# Patient Record
Sex: FEMALE | Race: WHITE | Employment: FULL TIME | URBAN - METROPOLITAN AREA
[De-identification: names, ages, dates, MRNs, and addresses within clinical notes are randomized per-mention and may not be internally consistent; named-entity substitution may affect disease eponyms.]

---

## 2021-12-13 ENCOUNTER — HOSPITAL ENCOUNTER (INPATIENT)
Age: 26
LOS: 2 days | Discharge: HOME | DRG: 514 | End: 2021-12-15
Attending: ORTHOPAEDIC SURGERY | Admitting: ORTHOPAEDIC SURGERY
Payer: COMMERCIAL

## 2021-12-13 ENCOUNTER — ANESTHESIA EVENT (OUTPATIENT)
Dept: SURGERY | Age: 26
DRG: 514 | End: 2021-12-13
Payer: COMMERCIAL

## 2021-12-13 ENCOUNTER — ANESTHESIA (OUTPATIENT)
Dept: SURGERY | Age: 26
DRG: 514 | End: 2021-12-13
Payer: COMMERCIAL

## 2021-12-13 DIAGNOSIS — L03.011 CELLULITIS OF FINGER OF RIGHT HAND: Primary | ICD-10-CM

## 2021-12-13 PROBLEM — L03.019 CELLULITIS, FINGER: Status: ACTIVE | Noted: 2021-12-13

## 2021-12-13 LAB
COVID-19 RAPID TEST, COVR: NOT DETECTED
HCG UR QL: NEGATIVE
SOURCE, COVRS: NORMAL

## 2021-12-13 PROCEDURE — 74011250636 HC RX REV CODE- 250/636: Performed by: PHYSICIAN ASSISTANT

## 2021-12-13 PROCEDURE — 74011250636 HC RX REV CODE- 250/636: Performed by: ANESTHESIOLOGY

## 2021-12-13 PROCEDURE — 77030019895 HC PCKNG STRP IODO -A: Performed by: ORTHOPAEDIC SURGERY

## 2021-12-13 PROCEDURE — 87635 SARS-COV-2 COVID-19 AMP PRB: CPT

## 2021-12-13 PROCEDURE — 2709999900 HC NON-CHARGEABLE SUPPLY: Performed by: ORTHOPAEDIC SURGERY

## 2021-12-13 PROCEDURE — 87205 SMEAR GRAM STAIN: CPT

## 2021-12-13 PROCEDURE — 76060000033 HC ANESTHESIA 1 TO 1.5 HR: Performed by: ORTHOPAEDIC SURGERY

## 2021-12-13 PROCEDURE — 76210000016 HC OR PH I REC 1 TO 1.5 HR: Performed by: ORTHOPAEDIC SURGERY

## 2021-12-13 PROCEDURE — 65270000029 HC RM PRIVATE

## 2021-12-13 PROCEDURE — 87075 CULTR BACTERIA EXCEPT BLOOD: CPT

## 2021-12-13 PROCEDURE — 81025 URINE PREGNANCY TEST: CPT

## 2021-12-13 PROCEDURE — 76010000149 HC OR TIME 1 TO 1.5 HR: Performed by: ORTHOPAEDIC SURGERY

## 2021-12-13 PROCEDURE — 0LB70ZZ EXCISION OF RIGHT HAND TENDON, OPEN APPROACH: ICD-10-PCS | Performed by: ORTHOPAEDIC SURGERY

## 2021-12-13 PROCEDURE — 74011250636 HC RX REV CODE- 250/636: Performed by: NURSE ANESTHETIST, CERTIFIED REGISTERED

## 2021-12-13 PROCEDURE — 0K9C3ZZ DRAINAGE OF RIGHT HAND MUSCLE, PERCUTANEOUS APPROACH: ICD-10-PCS | Performed by: ORTHOPAEDIC SURGERY

## 2021-12-13 PROCEDURE — 77030002888 HC SUT CHRMC J&J -A: Performed by: ORTHOPAEDIC SURGERY

## 2021-12-13 RX ORDER — SODIUM CHLORIDE, SODIUM LACTATE, POTASSIUM CHLORIDE, CALCIUM CHLORIDE 600; 310; 30; 20 MG/100ML; MG/100ML; MG/100ML; MG/100ML
100 INJECTION, SOLUTION INTRAVENOUS CONTINUOUS
Status: DISCONTINUED | OUTPATIENT
Start: 2021-12-13 | End: 2021-12-13 | Stop reason: HOSPADM

## 2021-12-13 RX ORDER — MIDAZOLAM HYDROCHLORIDE 1 MG/ML
INJECTION, SOLUTION INTRAMUSCULAR; INTRAVENOUS AS NEEDED
Status: DISCONTINUED | OUTPATIENT
Start: 2021-12-13 | End: 2021-12-13 | Stop reason: HOSPADM

## 2021-12-13 RX ORDER — HYDROXYZINE HYDROCHLORIDE 10 MG/1
10 TABLET, FILM COATED ORAL
Status: DISCONTINUED | OUTPATIENT
Start: 2021-12-13 | End: 2021-12-15 | Stop reason: HOSPADM

## 2021-12-13 RX ORDER — RIZATRIPTAN BENZOATE 5 MG/1
10 TABLET, ORALLY DISINTEGRATING ORAL
Status: DISPENSED | OUTPATIENT
Start: 2021-12-13 | End: 2021-12-14

## 2021-12-13 RX ORDER — SODIUM CHLORIDE 0.9 % (FLUSH) 0.9 %
5-40 SYRINGE (ML) INJECTION AS NEEDED
Status: DISCONTINUED | OUTPATIENT
Start: 2021-12-13 | End: 2021-12-15 | Stop reason: HOSPADM

## 2021-12-13 RX ORDER — FENTANYL CITRATE 50 UG/ML
INJECTION, SOLUTION INTRAMUSCULAR; INTRAVENOUS AS NEEDED
Status: DISCONTINUED | OUTPATIENT
Start: 2021-12-13 | End: 2021-12-13 | Stop reason: HOSPADM

## 2021-12-13 RX ORDER — SODIUM CHLORIDE, SODIUM LACTATE, POTASSIUM CHLORIDE, CALCIUM CHLORIDE 600; 310; 30; 20 MG/100ML; MG/100ML; MG/100ML; MG/100ML
INJECTION, SOLUTION INTRAVENOUS
Status: DISCONTINUED | OUTPATIENT
Start: 2021-12-13 | End: 2021-12-13 | Stop reason: HOSPADM

## 2021-12-13 RX ORDER — MIDAZOLAM HYDROCHLORIDE 1 MG/ML
INJECTION, SOLUTION INTRAMUSCULAR; INTRAVENOUS
Status: DISPENSED
Start: 2021-12-13 | End: 2021-12-14

## 2021-12-13 RX ORDER — PROPOFOL 10 MG/ML
INJECTION, EMULSION INTRAVENOUS
Status: DISCONTINUED | OUTPATIENT
Start: 2021-12-13 | End: 2021-12-13 | Stop reason: HOSPADM

## 2021-12-13 RX ORDER — POLYETHYLENE GLYCOL 3350 17 G/17G
17 POWDER, FOR SOLUTION ORAL DAILY
Status: DISCONTINUED | OUTPATIENT
Start: 2021-12-14 | End: 2021-12-15 | Stop reason: HOSPADM

## 2021-12-13 RX ORDER — OXYCODONE HYDROCHLORIDE 5 MG/1
5 TABLET ORAL
Status: DISCONTINUED | OUTPATIENT
Start: 2021-12-13 | End: 2021-12-15 | Stop reason: HOSPADM

## 2021-12-13 RX ORDER — ACETAMINOPHEN 500 MG
1000 TABLET ORAL EVERY 6 HOURS
Status: DISCONTINUED | OUTPATIENT
Start: 2021-12-14 | End: 2021-12-15 | Stop reason: HOSPADM

## 2021-12-13 RX ORDER — ACETAMINOPHEN 325 MG/1
650 TABLET ORAL
COMMUNITY

## 2021-12-13 RX ORDER — OXYCODONE HYDROCHLORIDE 5 MG/1
10 TABLET ORAL
Status: DISCONTINUED | OUTPATIENT
Start: 2021-12-13 | End: 2021-12-15 | Stop reason: HOSPADM

## 2021-12-13 RX ORDER — AMOXICILLIN 250 MG
1 CAPSULE ORAL 2 TIMES DAILY
Status: DISCONTINUED | OUTPATIENT
Start: 2021-12-13 | End: 2021-12-15 | Stop reason: HOSPADM

## 2021-12-13 RX ORDER — RIZATRIPTAN BENZOATE 10 MG/1
10 TABLET ORAL
COMMUNITY

## 2021-12-13 RX ORDER — LIDOCAINE HYDROCHLORIDE 10 MG/ML
0.1 INJECTION, SOLUTION EPIDURAL; INFILTRATION; INTRACAUDAL; PERINEURAL AS NEEDED
Status: DISCONTINUED | OUTPATIENT
Start: 2021-12-13 | End: 2021-12-13 | Stop reason: HOSPADM

## 2021-12-13 RX ORDER — FAMOTIDINE 20 MG/1
20 TABLET, FILM COATED ORAL 2 TIMES DAILY
Status: DISCONTINUED | OUTPATIENT
Start: 2021-12-13 | End: 2021-12-15 | Stop reason: HOSPADM

## 2021-12-13 RX ORDER — ONDANSETRON 2 MG/ML
4 INJECTION INTRAMUSCULAR; INTRAVENOUS
Status: DISPENSED | OUTPATIENT
Start: 2021-12-13 | End: 2021-12-14

## 2021-12-13 RX ORDER — SODIUM CHLORIDE 0.9 % (FLUSH) 0.9 %
5-40 SYRINGE (ML) INJECTION EVERY 8 HOURS
Status: DISCONTINUED | OUTPATIENT
Start: 2021-12-13 | End: 2021-12-15 | Stop reason: HOSPADM

## 2021-12-13 RX ORDER — ONDANSETRON 2 MG/ML
INJECTION INTRAMUSCULAR; INTRAVENOUS AS NEEDED
Status: DISCONTINUED | OUTPATIENT
Start: 2021-12-13 | End: 2021-12-13 | Stop reason: HOSPADM

## 2021-12-13 RX ORDER — NALOXONE HYDROCHLORIDE 0.4 MG/ML
0.4 INJECTION, SOLUTION INTRAMUSCULAR; INTRAVENOUS; SUBCUTANEOUS AS NEEDED
Status: DISCONTINUED | OUTPATIENT
Start: 2021-12-13 | End: 2021-12-15 | Stop reason: HOSPADM

## 2021-12-13 RX ORDER — HYDROMORPHONE HYDROCHLORIDE 1 MG/ML
.25-1 INJECTION, SOLUTION INTRAMUSCULAR; INTRAVENOUS; SUBCUTANEOUS
Status: DISCONTINUED | OUTPATIENT
Start: 2021-12-13 | End: 2021-12-13 | Stop reason: HOSPADM

## 2021-12-13 RX ORDER — FACIAL-BODY WIPES
10 EACH TOPICAL DAILY PRN
Status: DISCONTINUED | OUTPATIENT
Start: 2021-12-15 | End: 2021-12-15 | Stop reason: HOSPADM

## 2021-12-13 RX ORDER — HYDROMORPHONE HYDROCHLORIDE 1 MG/ML
0.5 INJECTION, SOLUTION INTRAMUSCULAR; INTRAVENOUS; SUBCUTANEOUS
Status: ACTIVE | OUTPATIENT
Start: 2021-12-13 | End: 2021-12-14

## 2021-12-13 RX ORDER — FENTANYL CITRATE 50 UG/ML
INJECTION, SOLUTION INTRAMUSCULAR; INTRAVENOUS
Status: DISPENSED
Start: 2021-12-13 | End: 2021-12-14

## 2021-12-13 RX ADMIN — FENTANYL CITRATE 50 MCG: 50 INJECTION, SOLUTION INTRAMUSCULAR; INTRAVENOUS at 17:05

## 2021-12-13 RX ADMIN — SODIUM CHLORIDE, POTASSIUM CHLORIDE, SODIUM LACTATE AND CALCIUM CHLORIDE 100 ML/HR: 600; 310; 30; 20 INJECTION, SOLUTION INTRAVENOUS at 14:58

## 2021-12-13 RX ADMIN — MEPIVACAINE HYDROCHLORIDE 25 ML: 20 INJECTION, SOLUTION EPIDURAL; INFILTRATION at 17:03

## 2021-12-13 RX ADMIN — ONDANSETRON HYDROCHLORIDE 4 MG: 2 SOLUTION INTRAMUSCULAR; INTRAVENOUS at 18:02

## 2021-12-13 RX ADMIN — PROMETHAZINE HYDROCHLORIDE 12.5 MG: 25 INJECTION INTRAMUSCULAR; INTRAVENOUS at 21:29

## 2021-12-13 RX ADMIN — VANCOMYCIN HYDROCHLORIDE 1 G: 10 INJECTION, POWDER, LYOPHILIZED, FOR SOLUTION INTRAVENOUS at 18:53

## 2021-12-13 RX ADMIN — HYDROMORPHONE HYDROCHLORIDE 0.5 MG: 1 INJECTION, SOLUTION INTRAMUSCULAR; INTRAVENOUS; SUBCUTANEOUS at 19:57

## 2021-12-13 RX ADMIN — MIDAZOLAM 2 MG: 1 INJECTION, SOLUTION INTRAMUSCULAR; INTRAVENOUS at 16:58

## 2021-12-13 RX ADMIN — MIDAZOLAM 2 MG: 1 INJECTION, SOLUTION INTRAMUSCULAR; INTRAVENOUS at 18:11

## 2021-12-13 RX ADMIN — SODIUM CHLORIDE, POTASSIUM CHLORIDE, SODIUM LACTATE AND CALCIUM CHLORIDE: 600; 310; 30; 20 INJECTION, SOLUTION INTRAVENOUS at 17:55

## 2021-12-13 RX ADMIN — ONDANSETRON 4 MG: 2 INJECTION INTRAMUSCULAR; INTRAVENOUS at 20:44

## 2021-12-13 RX ADMIN — HYDROMORPHONE HYDROCHLORIDE 0.5 MG: 1 INJECTION, SOLUTION INTRAMUSCULAR; INTRAVENOUS; SUBCUTANEOUS at 20:16

## 2021-12-13 RX ADMIN — HYDROMORPHONE HYDROCHLORIDE 0.5 MG: 1 INJECTION, SOLUTION INTRAMUSCULAR; INTRAVENOUS; SUBCUTANEOUS at 19:40

## 2021-12-13 RX ADMIN — PROPOFOL 200 MCG/KG/MIN: 10 INJECTION, EMULSION INTRAVENOUS at 18:09

## 2021-12-13 RX ADMIN — HYDROMORPHONE HYDROCHLORIDE 0.5 MG: 1 INJECTION, SOLUTION INTRAMUSCULAR; INTRAVENOUS; SUBCUTANEOUS at 19:25

## 2021-12-13 RX ADMIN — FENTANYL CITRATE 50 MCG: 50 INJECTION, SOLUTION INTRAMUSCULAR; INTRAVENOUS at 16:58

## 2021-12-13 NOTE — ANESTHESIA PREPROCEDURE EVALUATION
Relevant Problems   No relevant active problems       Anesthetic History   No history of anesthetic complications            Review of Systems / Medical History  Patient summary reviewed and pertinent labs reviewed    Pulmonary            Asthma : well controlled       Neuro/Psych   Within defined limits           Cardiovascular                  Exercise tolerance: >4 METS     GI/Hepatic/Renal  Within defined limits              Endo/Other  Within defined limits           Other Findings              Physical Exam    Airway  Mallampati: II  TM Distance: 4 - 6 cm  Neck ROM: normal range of motion   Mouth opening: Normal     Cardiovascular  Regular rate and rhythm,  S1 and S2 normal,  no murmur, click, rub, or gallop  Rhythm: regular  Rate: normal         Dental  No notable dental hx       Pulmonary  Breath sounds clear to auscultation               Abdominal  GI exam deferred       Other Findings            Anesthetic Plan    ASA: 2  Anesthesia type: regional - supraclavicular block      Post-op pain plan if not by surgeon: peripheral nerve block single      Anesthetic plan and risks discussed with: Patient

## 2021-12-13 NOTE — BRIEF OP NOTE
Brief Postoperative Note    Patient: Sharon Tanner  YOB: 1995  MRN: [id number]    Date of Procedure: 12/13/2021     Pre-Op Diagnosis: Cat Bite    Post-Op Diagnosis: Same as preoperative diagnosis. Procedure(s):  INCISION AND DRAINAGE RIGHT LONG FINGER, FLEXOR TENOSYNOVECTOMY    Surgeon(s):  Eusebia Dong MD    Surgical Assistant: Physician Assistant: Ellie Murphy PA-C    Anesthesia: Regional     Estimated Blood Loss (mL): Minimal    Complications: None    Specimens:   ID Type Source Tests Collected by Time Destination   1 : Right long finger Wound  CULTURE, AEROBIC AND ANAEROBIC Eusebia Dong MD 12/13/2021 1846 Microbiology        Implants: * No implants in log *    Drains: * No LDAs found *    Findings: As above.     Electronically Signed by Shun Lee PA-C on 12/13/2021 at 6:59 PM

## 2021-12-13 NOTE — ANESTHESIA PROCEDURE NOTES
Peripheral Block    Start time: 12/13/2021 4:58 PM  End time: 12/13/2021 5:06 PM  Performed by: Hardy Alicea MD  Authorized by: Hardy Alicea MD       Pre-procedure:    Indications: at surgeon's request, post-op pain management and primary anesthetic    Preanesthetic Checklist: patient identified, risks and benefits discussed, timeout performed and anesthesia consent given    Timeout Time: 16:58 EST          Block Type:   Block Type:  Supraclavicular  Laterality:  Right  Monitoring:  Continuous pulse ox, frequent vital sign checks, heart rate, responsive to questions and oxygen  Injection Technique:  Single shot  Procedures: ultrasound guided    Patient Position: supine  Prep: chlorhexidine    Location:  Interscalene  Needle Type:  Stimuplex  Needle Gauge:  22 G  Needle Localization:  Ultrasound guidance    Assessment:  Number of attempts:  1  Injection Assessment:  Incremental injection every 5 mL, local visualized surrounding nerve on ultrasound, negative aspiration for blood, no paresthesia and no intravascular symptoms  Patient tolerance:  Patient tolerated the procedure well with no immediate complications

## 2021-12-14 PROCEDURE — 65270000029 HC RM PRIVATE

## 2021-12-14 PROCEDURE — 74011250636 HC RX REV CODE- 250/636: Performed by: PHYSICIAN ASSISTANT

## 2021-12-14 PROCEDURE — 74011250637 HC RX REV CODE- 250/637: Performed by: PHYSICIAN ASSISTANT

## 2021-12-14 RX ADMIN — Medication 10 ML: at 14:08

## 2021-12-14 RX ADMIN — ACETAMINOPHEN 1000 MG: 500 TABLET ORAL at 00:09

## 2021-12-14 RX ADMIN — OXYCODONE 10 MG: 5 TABLET ORAL at 14:07

## 2021-12-14 RX ADMIN — ACETAMINOPHEN 1000 MG: 500 TABLET ORAL at 05:33

## 2021-12-14 RX ADMIN — ACETAMINOPHEN 1000 MG: 500 TABLET ORAL at 17:02

## 2021-12-14 RX ADMIN — VANCOMYCIN HYDROCHLORIDE 1000 MG: 1 INJECTION, POWDER, LYOPHILIZED, FOR SOLUTION INTRAVENOUS at 05:30

## 2021-12-14 RX ADMIN — DOCUSATE SODIUM 50MG AND SENNOSIDES 8.6MG 1 TABLET: 8.6; 5 TABLET, FILM COATED ORAL at 17:02

## 2021-12-14 RX ADMIN — OXYCODONE 5 MG: 5 TABLET ORAL at 00:10

## 2021-12-14 RX ADMIN — ONDANSETRON 4 MG: 2 INJECTION INTRAMUSCULAR; INTRAVENOUS at 14:07

## 2021-12-14 RX ADMIN — VANCOMYCIN HYDROCHLORIDE 750 MG: 750 INJECTION, POWDER, LYOPHILIZED, FOR SOLUTION INTRAVENOUS at 17:02

## 2021-12-14 RX ADMIN — POLYETHYLENE GLYCOL 3350 17 G: 17 POWDER, FOR SOLUTION ORAL at 08:03

## 2021-12-14 RX ADMIN — ACETAMINOPHEN 1000 MG: 500 TABLET ORAL at 11:31

## 2021-12-14 RX ADMIN — FAMOTIDINE 20 MG: 20 TABLET ORAL at 17:03

## 2021-12-14 RX ADMIN — FAMOTIDINE 20 MG: 20 TABLET ORAL at 08:03

## 2021-12-14 RX ADMIN — DOCUSATE SODIUM 50MG AND SENNOSIDES 8.6MG 1 TABLET: 8.6; 5 TABLET, FILM COATED ORAL at 08:03

## 2021-12-14 RX ADMIN — Medication 10 ML: at 05:35

## 2021-12-14 NOTE — PERIOP NOTES
TRANSFER - OUT REPORT:    Verbal report given to MERT Marinelli on [place]  being transferred to pre-op room 4  for routine post - op       Report consisted of patients Situation, Background, Assessment and   Recommendations(SBAR). Information from the following report(s) SBAR, Kardex, OR Summary, Procedure Summary, Intake/Output, MAR, Accordion, Recent Results, Med Rec Status and Cardiac Rhythm NSR was reviewed with the receiving nurse. Lines:   Peripheral IV 12/13/21 Anterior; Left Hand (Active)   Site Assessment Clean, dry, & intact 12/13/21 2033   Phlebitis Assessment 0 12/13/21 2033   Infiltration Assessment 0 12/13/21 2033   Dressing Status Clean, dry, & intact 12/13/21 2033   Dressing Type Transparent; Tape 12/13/21 2033   Hub Color/Line Status Pink; Flushed; Infusing 12/13/21 2033   Action Taken Open ports on tubing capped 12/13/21 2033   Alcohol Cap Used Yes 12/13/21 2033        Opportunity for questions and clarification was provided.       Patient transported with:   Registered Nurse

## 2021-12-14 NOTE — PROGRESS NOTES
Reason for Admission: Cat bite                    RUR Score:  1% LOW                     Plan for utilizing home health: Per recommendation pending progression during hospitalization stay- not anticipated at this time     PCP: First and Last name:  Willow Reynoso MD   Name of Practice:    Are you a current patient: Yes/No: Yes    Approximate date of last visit: Thursday prior to admission    Can you participate in a virtual visit with your PCP: Yes      Current Advanced Directive/Advance Care Plan: No Order- no AMD on file- pt is not interested in arranging any at this time, understands her  is Vinetta Diver. Healthcare Decision Maker:  Updated to reflect information obtained. Transition of Care Plan:                    Pt is a 23year old,  female, admitted with an elective surgery for cat bite. Pt was AOX4 when speaking with CM, confirming address, emergency contact and PCP. Pt lives with her  in a one level home and is completely independent with ADLs- has no DME and drives, works full time at [employer]. Pt has not had HH or been to any SNF/IPR in the past. Pt confirmed insurance coverage of [employer], states no problems affording or accessing medications. Pt's  will drive her home at time of discharge and as needed. Pt remains on Med. Surg- post op for elective surgery secondary to a cat bite- anticipated d/c date in 24-48 hours pending progression and potential need of abx. Pt states no questions or concerns at this time- floor CM updated and will continue to follow and assist as needed. Care Management Interventions  PCP Verified by CM:  Yes  Mode of Transport at Discharge: Self  Transition of Care Consult (CM Consult): Discharge Planning  MyChart Signup: No  Discharge Durable Medical Equipment: No  Health Maintenance Reviewed: Yes  Physical Therapy Consult: No  Occupational Therapy Consult: No  Speech Therapy Consult: No  Support Systems: Spouse/Significant Other  Confirm Follow Up Transport: Family  The Patient and/or Patient Representative was Provided with a Choice of Provider and Agrees with the Discharge Plan?: Yes  Freedom of Choice List was Provided with Basic Dialogue that Supports the Patient's Individualized Plan of Care/Goals, Treatment Preferences and Shares the Quality Data Associated with the Providers?: Yes  Discharge Location  Discharge Placement: Home with family assistance    SHASTA Barba, [address]

## 2021-12-14 NOTE — PROGRESS NOTES
[address] [address] [facility] Pharm[facility] Progress Note: Antimicrobial Stewardship    Consult for antibiotic dosing of vancomycin by Dr. Soco Gómez  Indication: cat bite  Day of Therapy: 2  S/p I&D right finger and flexor tenosynovectomy on 12/13/21    Plan:  Vancomycin therapy:   No loading dose, patient already on vancomycin 1000mg (20mg/kg) q12h post-op but will continue past the two doses that were originally ordered until cultures finalize. Adjust maintenance dosing to vancomycin 750mg IV every 12 hours. Dose calculated to approximate a   Target AUC/EZEQUIEL of 400-500  Trough of 10-15   Plan:  Not able to calculate CrCl as no Scr done (ordered one for tomorrow), current regimen predicts AUC of 528 (based on empiric Scr value of 1)-unable to predict true AUC without Scr value, do not anticipate any issues with clearing vancomycin based on age and past medical history. Will not order any level at this time as hopeful to stop abx shortly (will order tomorrow if patient remains on therapy). Pharmacy to follow daily and will make changes to dose and/or frequency based on clinical status. Date Dose & Interval Measured (mcg/mL) Extrapolated (mcg/mL)   ? ? ? ?   ? ? ? ?   ? ? ? ? Other Antimicrobial  (not dosed by pharmacist)   none   Cultures     12/13 right long finger: no organisms (prelim)  12/13 anaerobic: in process   Serum Creatinine     No results found for: JOSELITO, ACREA, CREA, REFC3, REFC4, CREAPOC    Creatinine Clearance CrCl cannot be calculated (No successful lab value found. ).      Procalcitonin  No results found for: PCT     Temp   98.6 °F (37 °C)    WBC   No results found for: WBC    For Antifungals, Metronidazole and Nafcillin: No results found for: ALT, AST, AP, TBILI      Pharmacist: Signed Kylie Padilla

## 2021-12-14 NOTE — PERIOP NOTES
TRANSFER - OUT REPORT:    Verbal report given to [facility]name[facility] on [place]  being transferred to [facility] (unit) for routine post - op       Report consisted of patients Situation, Background, Assessment and   Recommendations(SBAR). Information from the following report(s) SBAR, Kardex, Intake/Output, MAR, Recent Results and Cardiac Rhythm NSR was reviewed with the receiving nurse. Lines:   Peripheral IV 12/13/21 Anterior; Left Hand (Active)   Site Assessment Clean, dry, & intact 12/13/21 2050   Phlebitis Assessment 0 12/13/21 2050   Infiltration Assessment 0 12/13/21 2050   Dressing Status Clean, dry, & intact 12/13/21 2050   Dressing Type Transparent 12/13/21 2050   Hub Color/Line Status Pink; Infusing 12/13/21 2050   Action Taken Open ports on tubing capped 12/13/21 2050   Alcohol Cap Used Yes 12/13/21 2050        Opportunity for questions and clarification was provided.       Patient transported with:   Registered Nurse

## 2021-12-14 NOTE — ANESTHESIA POSTPROCEDURE EVALUATION
Procedure(s):  INCISION AND DRAINAGE RIGHT LONG FINGER, FLEXOR TENSYNOVECTOMY.    regional    Anesthesia Post Evaluation        Patient location during evaluation: PACU  Patient participation: complete - patient participated  Level of consciousness: sleepy but conscious  Pain management: adequate  Airway patency: patent  Anesthetic complications: no  Cardiovascular status: acceptable and stable  Respiratory status: acceptable and unassisted  Hydration status: acceptable  Comments: The patient was seen and evaluated in the post-operative period. The time of my evaluation may not match the time of this note. The patient denied uncontrolled pain or nausea, and there were no significant complications evident. Tamela Walsh MD      Post anesthesia nausea and vomiting:  none  Final Post Anesthesia Temperature Assessment:  Normothermia (36.0-37.5 degrees C)      INITIAL Post-op Vital signs:   Vitals Value Taken Time   /79 12/13/21 1910   Temp 36.5 °C (97.7 °F) 12/13/21 1908   Pulse 92 12/13/21 1914   Resp 14 12/13/21 1914   SpO2 98 % 12/13/21 1914   Vitals shown include unvalidated device data.

## 2021-12-14 NOTE — PERIOP NOTES
2044  Pt nauseated. Zofran 4 mg given IVP per PRN meds. 2129  Pt vomited x4 with no relief of nausea. Anesthesia called order received. Phenergan 12.5 mg IVP given for nausea.

## 2021-12-14 NOTE — OP NOTES
[facility]  OPERATIVE REPORT    Name:  Audie Bennett  MR#:  [id number]  :  1995  ACCOUNT #:  [de-identified]  DATE OF SERVICE:  2021    PREOPERATIVE DIAGNOSIS:  Right long finger cat bite with flexor tenosynovitis. POSTOPERATIVE DIAGNOSIS:  Right long finger cat bite with flexor tenosynovitis. PROCEDURES PERFORMED:  Right long finger incision and drainage and flexor tenosynovectomy. SURGEON:  Gama Ornelas MD    ASSISTANT:  Brittney Lewis PA-C. Indication for a need for an assistant: An assistant was required for assistance with exposure, retraction and application of the postoperative dressing. ANESTHESIA:  Regional.    COMPLICATIONS:  None. SPECIMENS REMOVED:  Fluid for culture. IMPLANTS:  None. ESTIMATED BLOOD LOSS:  Min. INDICATION:  The patient is a pleasant 71-year-old female who presented today in the office with 4/4 Kanavel's signs suggestive of flexor tenosynovitis who has failed oral antibiotics for the last several days after taking both a course of Augmentin as well as Flagyl and doxycycline. We discussed treatment options and I recommended to proceed with flexor tenosynovectomy. She understood the risks of bleeding, infection, damage to surrounding nerves and blood vessels, persistent pain symptoms, loss of function, wound healing problems, need for further surgery. She signed informed consent. PROCEDURE:  The patient was seen and identified in the preanesthesia care unit. The operative site was marked. She was evaluated by Anesthesia and a brachial plexus block was placed. She was brought to the operative suite on a stretcher and transferred to the operating room table. She was prepped and draped in the usual fashion. A time-out was undertaken conforming the appropriate site, side and procedure. The arm was elevated for 120 seconds and a well-padded tourniquet was inflated to 250 mmHg.     Next, a Brunner-type incision was made over the DIP crease volarly. Neurovascular bundle was retracted and the distal aspect of the flexor canal was identified and opened. There was some hyperemic synovium but no adán purulence. There was some trace fluid distally which was cultured. Next, an oblique incision made over the A1 pulley. The A1 pulley was exposed after the neurovascular bundles were retracted. Again, there was hyperemic/injected appearance of the A1 pulley but no underlying purulence. After this was divided, there was a rush of some clear fluid. This was cultured. At this point, the proximal 5 mm of the A2 pulley was also divided. An Angiocath was introduced from the proximal to distal and then several 100 mL of normal saline was flushed through this. After this was performed, the tourniquet was let down. Hemostasis was obtained with bipolar cautery. The wounds were packed with gauze and sterile dressing was applied. POSTOPERATIVE PLAN:  Follow culture results. Continue vancomycin empirically. This was started after cultures were taken. Keep overnight for IV antibiotics and evaluate progress in the morning.       Melita Lora MD      [id number]  D:  12/13/2021 18:54  T:  12/14/2021 5:45  JOB #:  [id number]

## 2021-12-14 NOTE — PROGRESS NOTES
Nutrition Assessment     Type and Reason for Visit: Initial (BMI)    Nutrition Recommendations/Plan:   1. Continue regular diet     Nutrition Assessment:    Pt is a 23year old female admitted with Cellulitis, finger [L03.019]. She  has a past medical history of Asthma and Migraines. Patient states -110#, with no recent weight or appetite changes. Had consumed 100% of lunch at time of RD visit.  in room, had provided additional snacks/beverages. No N/V/D. No chewing/swallowing problems. NKFA. Wt Readings from Last 10 Encounters:   12/13/21 48.3 kg (106 lb 7.7 oz)     Malnutrition Assessment:  Malnutrition Status: No malnutrition     Estimated Daily Nutrient Needs:  Energy (kcal):  1577 (MSJ x 1.3)  Protein (g):  39-48 (0.8-1.0)       Fluid (ml/day):  0962    Nutrition Related Findings:    ABD: WNL with active bowel sounds 12/13  Last BM: 12/12  Edema: None noted 12/114    Nutr. Labs:  No results found for: CGFR, GFRAA, GFRNA, CRCLT, CCT, JOSELITO, CREAPOC, ACREA, CREA, REFC3, REFC4, BUN, BUNPOC, IBUN, MBUNV, BUNV, NAPOC, NA, PNA, WBNA, K, KPOCT, KI, PLK, WBK, CLPOC, PCL, CL, WBCL, CO2, DIG, DIGP, MDIG  No results found for: GLU, GLUCPOC  No results found for: HBA1C, AGA3KDJJ, QNQ3PHRR, IPQ8OZIJ    Nutr.  Meds:  Pepcid  Miralax PRN  Pericolace     Current Nutrition Therapies:  ADULT DIET Regular    Anthropometric Measures:  · Height:  5' 4.02\" (162.6 cm)  · Current Body Wt:  48.3 kg (106 lb 7.7 oz)  · BMI: 18.3    Nutrition Diagnosis:   No nutrition diagnosis at this time     Nutrition Intervention:  Food and/or Nutrient Delivery: Continue current diet  Nutrition Education and Counseling: No recommendations at this time  Coordination of Nutrition Care: Continue to monitor while inpatient    Goals:  PO intake continues >/= 75% of all meals within 5 - 7 days       Nutrition Monitoring and Evaluation:   Behavioral-Environmental Outcomes: None identified  Food/Nutrient Intake Outcomes: Food and nutrient intake  Physical Signs/Symptoms Outcomes: Biochemical data, Weight    Discharge Planning:    No discharge needs at this time     Electronically signed by Angela Ayala RD on 01/05/1386  Contact Number: [phone] or via [employer]

## 2021-12-14 NOTE — PROGRESS NOTES
ASSESSMENT     Cinthia Mejia is a 23 y.o. female, who is POD# 1 s/p right long finger I&D. Patient feeling better today than prior to surgery. PLAN    1. Pain control. 2. Mobilize with PT / OT. Weight bearing Status : WBAT  3. DVT Prophylaxis : Mechanical  4. Awaiting intra-operative cultures. Continue vancomycin. 5. Soaks of the right finger/hand twice daily. Repack and reapply dressings afterwards. Continue elevation. 6. Disposition : Case Management for planning. Brittney Lewis PA-C  Office : [phone]  Cell: [phone]    Subjective:     Resting. States that she feels better today than prior to surgery and no longer has throbbing pain. Able to sleep through the night. Objective:   Patient Vitals for the past 12 hrs:   BP Temp Pulse Resp SpO2   12/14/21 0734 113/66 98.8 °F (37.1 °C) 74 18 99 %   12/14/21 0416 118/70 97.7 °F (36.5 °C) 69 18 98 %   12/14/21 0008 127/80 97.6 °F (36.4 °C) 80 18 100 %   12/13/21 2050 117/69 97.7 °F (36.5 °C) 89 9 93 %   12/13/21 2022 125/77  97 15 97 %   12/13/21 2015 122/82 97.9 °F (36.6 °C) 89 12 97 %   12/13/21 2009 130/79  91 13 96 %   12/13/21 2004 127/78  92 14 96 %       GENERAL: No acute distress. MUSCULOSKELETAL EXAM  Right upper extremity - Dressing clean, dry, and intact. Fingers warm and well perfused. Sensation grossly in tact to light touch over 1st dorsal web space, tips of fingers. +Thumb up, , finger extension, finger spread, OK. No streaking. No supratrochlear lymphadenopathy. LABS :  No results for input(s): HGB, HCT, INR, NA, K, CL, CO2, BUN, CREA, GLU, WBC, HGBEXT, HCTEXT, INREXT in the last 72 hours.     No results found for: [facility]    Lab Results   Component Value Date/Time    Culture result: PENDING 12/13/2021 06:46 PM

## 2021-12-14 NOTE — PROGRESS NOTES
Physician Progress Note      Jazmin Kaminski  CSN #:                  [id number]  :                       1995  ADMIT DATE:       2021 3:00 PM  [address] [place] DATE:  RESPONDING  PROVIDER #:        Rufino Jordan PA-C          QUERY TEXT:    Good Morning,    This patient admitted for angela bite. The patient was taken for I&D right long finger, flexor tenosynovectomy    To accurately reflect the procedure performed please further specify the depth of tissue incised and drained: The medical record reflects the following:  Risk Factors: Angela bite, cellulitis Failed outpt abx for the last sever days after taking a course of Augmentin as well as Flagyl and doxycycline. Clinical Indicators: TO OR on , brief op note notes \"incision and drainage to right long finger, flexor tenosynovectomy\"  Treatment: To OR for I&D of right long finer incison and drainage and flexor tenosynovectomy    Thank you for further clarifying,  Estella Olivo, [address] [facility], [address] [place] [address], [address] [place] [address]  Options provided:  -- Skin only  -- Subcutaneous tissue  -- Fascia  -- Muscle  -- Joint  -- Bone  -- Other - I will add my own diagnosis  -- Disagree - Not applicable / Not valid  -- Disagree - Clinically unable to determine / Unknown  -- Refer to Clinical Documentation Reviewer    PROVIDER RESPONSE TEXT:    Addendum to  procedure note: The depth of the drainage to Right long finger was down to and including muscle.     Query created by: Obdulia Auguste on 2021 7:52 AM      Electronically signed by:  Rufino Jordan PA-C 2021 8:07 AM

## 2021-12-15 VITALS
SYSTOLIC BLOOD PRESSURE: 113 MMHG | OXYGEN SATURATION: 97 % | WEIGHT: 106.48 LBS | DIASTOLIC BLOOD PRESSURE: 68 MMHG | BODY MASS INDEX: 18.18 KG/M2 | TEMPERATURE: 98.6 F | HEART RATE: 89 BPM | RESPIRATION RATE: 17 BRPM | HEIGHT: 64 IN

## 2021-12-15 LAB — CREAT SERPL-MCNC: 0.7 MG/DL (ref 0.55–1.02)

## 2021-12-15 PROCEDURE — 74011250636 HC RX REV CODE- 250/636: Performed by: PHYSICIAN ASSISTANT

## 2021-12-15 PROCEDURE — 36415 COLL VENOUS BLD VENIPUNCTURE: CPT

## 2021-12-15 PROCEDURE — 74011250637 HC RX REV CODE- 250/637: Performed by: PHYSICIAN ASSISTANT

## 2021-12-15 PROCEDURE — 82565 ASSAY OF CREATININE: CPT

## 2021-12-15 RX ORDER — ONDANSETRON 4 MG/1
4 TABLET, ORALLY DISINTEGRATING ORAL
Qty: 20 TABLET | Refills: 0 | Status: SHIPPED | OUTPATIENT
Start: 2021-12-15

## 2021-12-15 RX ORDER — TRAMADOL HYDROCHLORIDE 50 MG/1
50 TABLET ORAL
Qty: 10 TABLET | Refills: 0 | Status: SHIPPED | OUTPATIENT
Start: 2021-12-15 | End: 2021-12-20

## 2021-12-15 RX ADMIN — FAMOTIDINE 20 MG: 20 TABLET ORAL at 09:03

## 2021-12-15 RX ADMIN — ACETAMINOPHEN 500 MG: 500 TABLET ORAL at 00:43

## 2021-12-15 RX ADMIN — Medication 10 ML: at 05:31

## 2021-12-15 RX ADMIN — Medication 10 ML: at 14:05

## 2021-12-15 RX ADMIN — VANCOMYCIN HYDROCHLORIDE 750 MG: 750 INJECTION, POWDER, LYOPHILIZED, FOR SOLUTION INTRAVENOUS at 14:05

## 2021-12-15 RX ADMIN — OXYCODONE 10 MG: 5 TABLET ORAL at 15:13

## 2021-12-15 RX ADMIN — VANCOMYCIN HYDROCHLORIDE 750 MG: 750 INJECTION, POWDER, LYOPHILIZED, FOR SOLUTION INTRAVENOUS at 05:28

## 2021-12-15 RX ADMIN — ACETAMINOPHEN 1000 MG: 500 TABLET ORAL at 12:07

## 2021-12-15 NOTE — PROGRESS NOTES
I have reviewed discharge instructions with the patient and caregiver. The patient and caregiver verbalized understanding. PIV removed. Pt AAO x 4 and VSS.

## 2021-12-15 NOTE — PROGRESS NOTES
12/15/2021  Case Management Progress Note    10:55 AM  Patient is 23year old female admitted 12/13 with cellulitis/cat bite  Patient's RUR is 2% green/low risk for readmission  Covid test: negative 12/13   Chart reviewed--patient discussed at IDR rounds  Noted discharge summary in the chart, patient may discharge today. No noted needs from CM standpoint--patient lives with her  and plan remains for her to return home with family assistance. Will continue to follow and assist as needed. Transition of Care Plan   1. Continue medical management  2. Maybe home today   3. Home with family assistance   4.  will transport   5. Follow up with PCP, specialties as needed  6.  CM will follow    SHASTA Gutierres

## 2021-12-15 NOTE — DISCHARGE INSTRUCTIONS
Upper Extremity Surgery Discharge Instructions  Dr. Jeana León / David Yang PA-C    Please take the time to review the following instructions before you leave the hospital and use them as guidelines during your recovery from surgery. If you have any questions, you may contact my office at [phone] or via [employer] messaging, which is typically the quickest and most direct method. Wound Care / Dressing Change    Please keep the incisions covered. You will follow-up with hand therapy for wound care. On days that you do not attend hand therapy for wound care, please soak your hand twice a day with warm soapy water. Use two pumps of antibacterial liquid soap (eg. Dial) and 1 tablespoon of hydrogen peroxide with warm water for 30 minutes twice per day. Apply clean, dry dressings after soaks. Showering / Bathing    You may bathe/shower as long as dressing/splint/cast is kept dry. Do not take a bath or get into a swimming pool or Jacuzzi until you follow up with Dr. Jane Jimenez. Do not soak your incisions under water, including dish water. Activity    Please begin using fingers immediately after surgery, working to improve motion of straightening and flexing your fingers several times per day. No lifting more than 2 lbs with your affected hand until the sutures come out or are checked at your first post-operative visit. Otherwise, you may proceed with activity as tolerated. Ice and Elevation    Keep your hand elevated continuously for 48 hours after surgery. Your hand/wrist should always be above the level of your heart. Sleep with your arm elevated on several pillows to minimize swelling and discomfort. Continue ice consistently for 48 hours after surgery. After 48 hours, you should ice 3 times per day for 20 minutes at a time for the next 5 days. After 1 week from surgery, you may use ice as needed for pain.      Diet    You may advance your regular diet as tolerated. Increase your clear liquid intake for the next 2-3 days. Medications    1. You will be given prescriptions for pain medication, and nausea when you are discharged from the hospital. Please use the medications as prescribed. Pain medications may cause constipation - over the counter Colace or Milk of Magnesia may be used as needed. Other possible side effects of pain medications are dizziness, headache, nausea, vomiting, and urinary retention. Discontinue the pain medication if you develop itching, rash, shortness of breath, or difficulties swallowing. If these symptoms become severe or arent relieved by discontinuing the medication, you should seek immediate medical attention. 2. Refills of pain medication are authorized during office hours only (8AM - 5PM Monday through Friday)  3. If you pain medication prescribed at the time of surgery contains Tylenol/Acetaminophen, DO NOT TAKE additional Tylenol/Acetaminophen. Do not exceed 4000mg of Tylenol/Acetaminophen per day. 4. You may resume the medication you were taking prior to your surgery. Pain medication may change the effects of any antidepressant medication you may be taking. If you have any questions about possible interactions between your regular medication and the pain medication, you should consult the physician who prescribes your regular medications. 5. Do not drive while taking pain medication. 6. You were prescribed a nausea medication. It is only necessary to fill this if you are experiencing nausea. Please call the office at [phone] if you have any increasing numbness or tingling, increasing drainage on your dressing, fever greater than 100.5 degrees F, or pain not controlled by medications. If you are experiencing chest pain or shortness of breath, please alert your primary care physician immediately.

## 2021-12-15 NOTE — PROGRESS NOTES
Bedside and Verbal shift change report given to Gayatri Avila (oncoming nurse) by [facility] (offgoing nurse). Report included the following information SBAR, Kardex, ED Summary, Intake/Output, MAR and Recent Results.

## 2021-12-15 NOTE — PROGRESS NOTES
ASSESSMENT     Vaibhav Rodriguez is a 23 y.o. female, who is POD# 2 s/p right long finger I&D. Patient continues to feel better than prior to surgery. PLAN    1. Pain control. 2. Mobilize with PT / OT. Weight bearing Status : WBAT  3. DVT Prophylaxis : Mechanical  4. Awaiting intra-operative cultures. No growth yet. Continue vancomycin. 5. Soaks of the right finger/hand twice daily. Repack and reapply dressings afterwards. Continue elevation. 6. Disposition : Case Management for planning. One more dose of vancomycin, then home today with oral Augmentin and out-patient follow-up this afternoon for wound care. Brittney Lewis PA-C  Office : [phone]  Cell: [phone]    Subjective:     Resting, complaining of little to no pain. Continues to feel better than prior to surgery. Objective:   Patient Vitals for the past 12 hrs:   BP Temp Pulse Resp SpO2   12/15/21 0344 110/70 98.7 °F (37.1 °C) 81 18 95 %   12/14/21 2301 119/74 98.8 °F (37.1 °C) 79 18 96 %       GENERAL: No acute distress. MUSCULOSKELETAL EXAM  Right upper extremity - Dressing clean, dry, and intact. Fingers warm and well perfused. Sensation grossly in tact to light touch over 1st dorsal web space, tips of fingers. +Thumb up, , finger extension, finger spread, OK. No streaking.  No supratrochlear lymphadenopathy.     LABS :  Recent Labs     12/15/21  0515   CREA 0.70       No results found for: SDES    Lab Results   Component Value Date/Time    Culture result: NO GROWTH THUS FAR 12/13/2021 06:46 PM

## 2021-12-15 NOTE — PROGRESS NOTES
Bedside and Verbal shift change report given to Fannie Pardo, RN (oncoming nurse) by Eli Joshi RN (offgoing nurse). Report included the following information SBAR, Kardex, Intake/Output, MAR and Recent Results.

## 2021-12-15 NOTE — DISCHARGE SUMMARY
Discharge Summary    Patient ID:  Jennifer Arndt  1995  23 y.o.  [id number]    Admit date: 12/13/2021    Discharge date and time: 12/15/21     Admitting Physician: Dianelys Cody MD     Admission Diagnoses: Cellulitis, finger [D16.612]    Discharge Diagnoses: Active Problems:    Cellulitis, finger (12/13/2021)        Surgeon: Rachel Melvin MD    Postoperative transfusions:     None. HOSPITAL COURSE:  The patients history, physical exams, and labs are well document in the chart. On the date of admission, the patient successfully underwent surgery and suffered no complications intraoperatively. Please refer to the admission history and physical as well as the operative note for details of the injury as well as the discussion of risks, benefits, and alternatives to the above procedure. From PACU, the patient was transferred to the [facility] in stable condition. Postoperatively, pain was initially controlled with a combination of IV and oral narcotics initially and at the time of discharge was controlled with solely oral medications. The patient received SCDs for DVT prophylaxis postoperatively until discharge. The remainder of the hospital course was unremarkable, and the patient was/will be discharged on the above date. RECENT LABS: No results for input(s): WBC, HGB, HCT, PLT, HGBEXT, HCTEXT, PLTEXT in the last 72 hours. COMPLICATIONS: None identified. IMPORTANT HOSPITAL EVENTS : I&D right long finger    DISCHARGE TO:     Home. FOLLOWUP: Today with hand therapy. DISCHARGE DIET: Resume preadmission diet    DISCHARGE MEDS:   Please refer to discharge medication reconciliation. DISCHARGE INSTRUCTIONS:  Please refer to attached discharge instructions.     Brittney Lewis PA-C 12/15/2021 8:36 AM

## 2021-12-17 LAB
BACTERIA SPEC CULT: ABNORMAL
BACTERIA SPEC CULT: ABNORMAL
BACTERIA SPEC CULT: NORMAL
GRAM STN SPEC: ABNORMAL
GRAM STN SPEC: ABNORMAL
SERVICE CMNT-IMP: ABNORMAL
SERVICE CMNT-IMP: NORMAL

## 2022-03-19 PROBLEM — L03.019 CELLULITIS, FINGER: Status: ACTIVE | Noted: 2021-12-13

## 2023-07-06 ENCOUNTER — TRANSCRIBE ORDERS (OUTPATIENT)
Facility: HOSPITAL | Age: 28
End: 2023-07-06

## 2023-07-06 DIAGNOSIS — N97.1 FEMALE INFERTILITY OF TUBAL ORIGIN: Primary | ICD-10-CM

## 2023-07-13 ENCOUNTER — HOSPITAL ENCOUNTER (OUTPATIENT)
Facility: HOSPITAL | Age: 28
Discharge: HOME | End: 2023-07-13
Attending: OBSTETRICS & GYNECOLOGY
Payer: COMMERCIAL

## 2023-07-13 DIAGNOSIS — N97.1 FEMALE INFERTILITY OF TUBAL ORIGIN: ICD-10-CM

## 2023-07-13 PROCEDURE — 6360000004 HC RX CONTRAST MEDICATION: Performed by: RADIOLOGY

## 2023-07-13 PROCEDURE — 74740 X-RAY FEMALE GENITAL TRACT: CPT

## 2023-07-13 RX ADMIN — IOPAMIDOL 20 ML: 755 INJECTION, SOLUTION INTRAVENOUS at 14:20

## (undated) DEVICE — GAUZE,SPONGE,FLUFF,6"X6.75",STRL,5/TRAY: Brand: MEDLINE

## (undated) DEVICE — CATHETER IV 14GA L1.25IN TEF STR HUB INTROCAN SFTY

## (undated) DEVICE — WRAP COHESIVE W2INXL5YD TAN SELF ADH BNDG HND NON STERILE TEAR CARING

## (undated) DEVICE — TUBING SUCT 10FR MAL ALUM SHFT FN CAP VENT UNIV CONN W/ OBT

## (undated) DEVICE — DRAPE,U/ SHT,SPLIT,PLAS,STERIL: Brand: MEDLINE

## (undated) DEVICE — TUBING, SUCTION, 1/4" X 10', STRAIGHT: Brand: MEDLINE

## (undated) DEVICE — HAND-SFMCASU: Brand: MEDLINE INDUSTRIES, INC.

## (undated) DEVICE — STERILE POLYISOPRENE POWDER-FREE SURGICAL GLOVES: Brand: PROTEXIS

## (undated) DEVICE — SUTURE CHROMIC GUT SZ 4-0 L18IN ABSRB BRN L13MM P-3 3/8 CIR 1654G

## (undated) DEVICE — GOWN,SIRUS,NONRNF,SETINSLV,2XL,18/CS: Brand: MEDLINE

## (undated) DEVICE — DRESSING,GAUZE,XEROFORM,CURAD,5"X9",ST: Brand: CURAD

## (undated) DEVICE — PAD,ABDOMINAL,5"X9",ST,LF,25/BX: Brand: MEDLINE INDUSTRIES, INC.

## (undated) DEVICE — GLOVE ORANGE PI 7   MSG9070

## (undated) DEVICE — GLOVE SURG SZ 85 L12IN FNGR THK79MIL GRN LTX FREE

## (undated) DEVICE — GAUZE,PACKING STRIP,IODOFORM,1/2"X5YD,ST: Brand: CURAD

## (undated) DEVICE — SOL IRRIGATION INJ NACL 0.9% 500ML BTL

## (undated) DEVICE — GLOVE SURG SZ 75 L12IN FNGR THK79MIL GRN LTX FREE

## (undated) DEVICE — SKIN PREP TRAY W/CHG: Brand: MEDLINE INDUSTRIES, INC.

## (undated) DEVICE — SWAB CULT DBL W/O CHAR RAYON TIP AMIES GEL CLMN FOR COLL